# Patient Record
(demographics unavailable — no encounter records)

---

## 2024-10-31 NOTE — HISTORY OF PRESENT ILLNESS
[FreeTextEntry1] : Ms. RASHAWN CUELLO was seen today at the [facility]. She is a 61 year woman that was referred for evaluation of Mobitz I AV block. Ms. Cuello was referred by Drs Anibal and Ephraim for evaluation of prolonged MS interval and Mobitz I AV block.  She is also followed by Dr Rojas and has a history of endocarditis and stroke with subsequent mitral valve disease and mitral valve replacement in 2006. She subsequently underwent transcatheter valve in valve replacement for prosthestic mitral valve stenosis in 2019 and required treatment with oral anticoagulant therapy due to valve thrombosis.  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  She has residual right sided hand and arm weakness and has mild left sided weakness due to the stroke in 2006.  She is able to climb 1.5 flights of stairs without stopping and lives in a two story walk up apartment.  Following MCOT monitor demonstrating periods of high degree AV block a Medtronic dual chamber PPM was implanted. Today presents concerned about discoloration over her pulse generator.  She has no fevers and no pain or swelling.  She reports using hydrocortisone cream for itching over pulse generator as well as benadryl cream without relief.  Past Medical History  Pheochromocytoma status post right adrenalectomy  HTN  HFrEF NICM with LVEF 40-45%    Constitutional: WN WD WF NAD Eyes: Sclera white. PERRLA. EOMI. ENMT: No obvious oral lesions. Oropharynx clear. No palpable neck masses. Extremities: No C/C/E. CV: No JVD or carotid bruit. PMI nl. S1S2 RRR No M/R/H/G Lungs: CTA & P Abd: +BS, NT/ND no HSM : Deferred Skin: no lesions or rash- pacemaker incision well healed, no erythema or swelling no warmth.  Darker discoloration over entire area of pulse generator, with maculopapular rash along left breast and axilla. Neuro: A&Ox3, mild left upper and lower extremity weakness.  Right sided hand weakness with decreased  and limited range of motion of all fingers excluding the thumb. Psych: no abnl behaviors during exam  Medtronic dual chamber PPM   Echo 2024 LVEF 40-45%

## 2024-10-31 NOTE — DISCUSSION/SUMMARY
[FreeTextEntry1] : Impression  1. Prolonged DE interval with Mobitz I AV Block now status post Medtronic dual chamber PPM 8/13/24 with resumption of beta blocker therapy. Now complaining of discoloration along pulse generator site 2. Status post MVR 2006 and subsequent transcatheter JOSEPH 2019  3. 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 HTN  5. NICM with LVEF 40-45% on GDMT  6. Axillary artery pseudoaneurysm repair   Plan Ms. Hinton has dark discoloration over her pulse generator and a rash along left breast and axilla.  She attributes this to her use of pepermint soap- however would expect the rash to be diffuse.  She was implanted on August 13th and Tyrx pouch utilized.  Will initiate keflex therapy for one week and have her follow up in 2 weeks time for wound check.  She is aware that if this is indeed due to infection that the system would need to be removed.

## 2024-12-05 NOTE — DISCUSSION/SUMMARY
[FreeTextEntry1] : Impression  1. Prolonged [place] interval with Mobitz I AV Block now status post Medtronic dual chamber PPM 8/13/24 with resumption of beta blocker therapy.  Incision site is well healed an previous discoloration/rash has resolved. 2. Status post MVR 2006 and subsequent transcatheter JOSEPH 2019  3. 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 HTN  5. NICM with LVEF 40-45% on GDMT  6. Axillary artery pseudoaneurysm repair   Plan Ms. Hinton is doing well and issues related to skin rash and discoloration over implanted PPM have largely resolved.  Follow up in three months for device check.  Smoking cessation discussed and patient urged to  [EKG obtained to assist in diagnosis and management of assessed problem(s)] : EKG obtained to assist in diagnosis and management of assessed problem(s)

## 2024-12-05 NOTE — HISTORY OF PRESENT ILLNESS
[FreeTextEntry1] : Ms. RASHAWN CUELLO was seen today at the [facility]. She is a 61 year woman that was referred for evaluation of Mobitz I AV block. Ms. Cuello was referred by Drs Anibal and Ephraim for evaluation of prolonged [place] interval and Mobitz I AV block.  She is also followed by Dr Rojas and has a history of endocarditis and stroke with subsequent mitral valve disease and mitral valve replacement in 2006. She subsequently underwent transcatheter valve in valve replacement for prosthestic mitral valve stenosis in 2019 and required treatment with oral anticoagulant therapy due to valve thrombosis.  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  She has residual right sided hand and arm weakness and has mild left sided weakness due to the stroke in 2006.  She is able to climb 1.5 flights of stairs without stopping and lives in a two story walk up apartment.  Following MCOT monitor demonstrating periods of high degree AV block a Medtronic dual chamber PPM was implanted. Last ivisit she noted  discoloration over her pulse generator after applying steroid cream and peppermint soap to the area.   Since that time she has noted improvement and no longer notes pruritis.  She continues to smoke cigarrettes.  Past Medical History  Pheochromocytoma status post right adrenalectomy  HTN  HFrEF NICM with LVEF 40-45%    Constitutional: WN WD WF NAD Eyes: Sclera white. PERRLA. EOMI. ENMT: No obvious oral lesions. Oropharynx clear. No palpable neck masses. Extremities: No C/C/E. CV: No JVD or carotid bruit. PMI nl. S1S2 RRR No M/R/H/G Lungs: CTA & P Abd: +BS, NT/ND no HSM : Deferred Skin: no lesions or rash- pacemaker incision well healed, no erythema or swelling no warmth.  Darker discoloration over area of pulse generator has largely subsided.  Incision is well healed. Neuro: A&Ox3, mild left upper and lower extremity weakness.  Right sided hand weakness with decreased  and limited range of motion of all fingers excluding the thumb. Psych: no abnl behaviors during exam  Medtronic dual chamber PPM  in situ  ECG A sense V pace with capture and normal QRS axis with QRSd 130msec- consistent with conduction system capture.  Echo 2024 LVEF 40-45%

## 2025-01-22 NOTE — ASSESSMENT
[FreeTextEntry1] : 58 yo woman with hx od MV disease s/p surgical MVR and later transcatheter Terri for bioprosthetic stenosis. This was later complicated by valve thrombosis which improved with a/c (warfarin). 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 We are consulted for GDMT optimization for HFrEF.  Diagnosed with pheochromocytoma. Managed periop with Doxazosin and Coreg. Path showed well differentiated pheo in the adrenal gland. Now doing well from this.   Now also s/p PPM implant,   GDMT was resumed. She is doing well on this, not on diuretics.  - Continue Entresto to 49-51 mg bid - Resume Carvedilol to 12.5 mg bid - Continue Farxiga 10 mg daily - Eliquis 5 mg bid - Off Amiodarone - No maintenance diuretic needs - F/u for PPM care with Dr. Jeb Núñez MD

## 2025-01-22 NOTE — CARDIOLOGY SUMMARY
[de-identified] : 8/2024 TTE: LV 4 cm, LVH (SW 1.2 cm, PW 1 cm), LVEF 45%, RV is enalrged and borderline reduced, mod TELMA, s/p MVr with mild prothetic mitral stenosis ( peak gradient 11.4 mmHg / mean 6.90 mmHg), mod TR, IVC dilated

## 2025-01-22 NOTE — HISTORY OF PRESENT ILLNESS
[FreeTextEntry1] : Date of HF diagnosis: Etiology of CMP: Valvular Date of last hospitalization: 1/2023 Date of last echocardiogram: 14/2024 LVEF/LVEDD: 45%%/4.5cm Type of ischemic work-up: [facility] 2019 Prior RHC: No  Diabetes: No Afib: Yes CKD: Yes ICD/CRT: No  ACEi/ARB: No ARNI: Yes MRA: No BB: Yes SGLT2i: Yes Nitrates/Hydralazine: No  CardioMEMs: No In clinical trial: No  60 yo woman with prior MVR 2019 and bio-MVR stenosis in 2022 treated with transcatheter Terri. Later complicated by valve thrombosis treated effectively with anticoagulation. Later was readmitted for axillary artery pseudoaneurysm treated with endograft placement per vascular surgery.  She has severe LV dysfunction of non-ischemic etiology, known since at least 2018 on submaximal GDMT as outpatient. Noted to have worsened LV function this admission.  On serial follow-up with HF clinic, we have increased her ARNI and BB dose. She is off MRA.  Had an incidental adrenal mass discovered in 8/2022. Further work-up led to suspicion of pheochromocytoma. Removed on 9/7/2023.  After adrenalectomy BP has been decreasing.  On last visit I lowered ARNI and BB due to hypotension. On follow-up she was feeling well.   ECG showed 1st degree AVB with Wenckebach and she was referred to EP for a PPM evaluation.   She is doing well now back on GDMT. No LE edema, orthopnea or PND. Not on loop diuretics. Just had a new nerve block done by pain management with some improvement in her R arm pain.

## 2025-03-25 NOTE — CURRENT MEDS
Discussed per 10/3/19 RTW letter continued time off until 12/10/19 re-evaluation. PT notes faxed to [place] [phone] per request.    [Takes medication as prescribed] : takes [None] : Patient does not have any barriers to medication adherence

## 2025-03-27 NOTE — HISTORY OF PRESENT ILLNESS
[Current] : current [< 20 pack-years] : < 20 pack-years [Never] : never [TextBox_4] : 61 yr old female last seen in 2020, (currently smoking PMH of mediastinal lymphadenopathy multiple pulmonary nodules, PHT, stroke in 2006, Severe mitral stenosis s/p MVR, PPM 8/13/2024, benign mass removed by adrenal gland 9/2023. Presents today to reestablish care after last visit in 2020.  She was recently in ER at [facility] this Thursday.  She was having SOB due to weather changing.  She was given prednisone for 4 days which finished yesterday and given z pack finished.  She was also given neb treatments in ER and Symbicort to go home. She does have ipratropium and albuterol nebulizer.  She feels better now.  No coughing, wheezing, fevers, chest pain.  She can walk 3 blocks.    PFT today improvement in guero and DLCO compared to 2019.  Combined OLD and RLD.  8/2024 echo - moderate PHT 55 mmHg 9/2022  echo - Mild PHT 47 mm hg.  Moderate grade 2 left ventricular diastolic dysfunction.  Moderately enlarged right ventricular cavity.  Moderate tricuspid regurgitation.   CXR 8/2024  There is mild linear left lung base subsegmental atelectasis or fibrosis. There are small pleural effusions. The heart size is upper limits of normal. The patient status post sternotomy and cardiac valve replacement. There is mild tortuosity thoracic aorta. Is admitted to the hospital on he had a monitor to ensure the heart rate is low and she got the pacemaker.  She still smokes but she is tired during the day in the morning but she seems to be okay.  She is able to go to flight of stairs with no problem.  She uses the albuterol or Symbicort maybe once or twice every 2 weeks   [TextBox_11] : 0.5 [TextBox_13] : 34 [ESS] : 0

## 2025-03-27 NOTE — HISTORY OF PRESENT ILLNESS
[Current] : current [< 20 pack-years] : < 20 pack-years [Never] : never [TextBox_4] : 61 yr old female last seen in 2020, (currently smoking PMH of mediastinal lymphadenopathy multiple pulmonary nodules, PHT, stroke in 2006, Severe mitral stenosis s/p MVR, PPM 8/13/2024, benign mass removed by adrenal gland 9/2023. Presents today to reestablish care after last visit in 2020.  She was recently in ER at [facility] this Thursday.  She was having SOB due to weather changing.  She was given prednisone for 4 days which finished yesterday and given z pack finished.  She was also given neb treatments in ER and Symbicort to go home. She does have ipratropium and albuterol nebulizer.  She feels better now.  No coughing, wheezing, fevers, chest pain.  She can walk 3 blocks.    PFT today improvement in guero and DLCO compared to 2019.  Combined OLD and RLD.  8/2024 echo - moderate PHT 55 mmHg 9/2022  echo - Mild PHT 47 mm hg.  Moderate grade 2 left ventricular diastolic dysfunction.  Moderately enlarged right ventricular cavity.  Moderate tricuspid regurgitation.   CXR 8/2024  There is mild linear left lung base subsegmental atelectasis or fibrosis. There are small pleural effusions. The heart size is upper limits of normal. The patient status post sternotomy and cardiac valve replacement. There is mild tortuosity thoracic aorta. Is admitted to the hospital on he had a monitor to ensure the heart rate is low and she got the pacemaker.  She still smokes but she is tired during the day in the morning but she seems to be okay.  She is able to go to flight of stairs with no problem.  She uses the albuterol or Symbicort maybe once or twice every 2 weeks   [TextBox_11] : 0.5 [TextBox_13] : 34 [ESS] : 0

## 2025-03-27 NOTE — HISTORY OF PRESENT ILLNESS
[Current] : current [< 20 pack-years] : < 20 pack-years [Never] : never [TextBox_4] : 61 yr old female last seen in 2020, (currently smoking PMH of mediastinal lymphadenopathy multiple pulmonary nodules, PHT, stroke in 2006, Severe mitral stenosis s/p MVR, PPM 8/13/2024, benign mass removed by adrenal gland 9/2023. Presents today to reestablish care after last visit in 2020.  She was recently in ER at [facility] this Thursday.  She was having SOB due to weather changing.  She was given prednisone for 4 days which finished yesterday and given z pack finished.  She was also given neb treatments in ER and Symbicort to go home. She does have ipratropium and albuterol nebulizer.  She feels better now.  No coughing, wheezing, fevers, chest pain.  She can walk 3 blocks.    PFT today improvement in guero and DLCO compared to 2019.  Combined OLD and RLD.  8/2024 echo - moderate PHT 55 mmHg 9/2022  echo - Mild PHT 47 mm hg.  Moderate grade 2 left ventricular diastolic dysfunction.  Moderately enlarged right ventricular cavity.  Moderate tricuspid regurgitation.   CXR 8/2024  There is mild linear left lung base subsegmental atelectasis or fibrosis. There are small pleural effusions. The heart size is upper limits of normal. The patient status post sternotomy and cardiac valve replacement. There is mild tortuosity thoracic aorta. Is admitted to the hospital on he had a monitor to ensure the heart rate is low and she got the pacemaker.  She still smokes but she is tired during the day in the morning but she seems to be okay.  She is able to go to flight of stairs with no problem.  She uses the albuterol or Symbicort maybe once or twice every 2 weeks   [TextBox_11] : 0.5 [TextBox_13] : 34 [ESS] : 0

## 2025-03-27 NOTE — END OF VISIT
[Time Spent: ___ minutes] : I have spent [unfilled] minutes of time on the encounter which excludes teaching and separately reported services. [FreeTextEntry3] : Pt seen with NATASHA Brooks and agree on the above plan of care. The patient is clinically improving.  The patient is not totally compliant with the Symbicort.  Instructed the patient to continue on Symbicort 2 puffs twice a day and rinse the mouth.  I also informed her that most the improvement in her PFT could be related to control of her CHF.  Follow-up on CT scan of the chest

## 2025-03-27 NOTE — ASSESSMENT
[FreeTextEntry1] : #Pulmonary Nodules The CT scan of the chest revealed small nodule.  The patient is patient still smoking I will be included in the lung cancer screening program and just got her to have a CT scan next year #Asthma The patient using the albuterol or Symbicort as needed basis.  That probably she uses every once a week or every 2 weeks.  At this point the patient is stable on the current regimen and will continue as needed.  Since the last visit the patient did not require neither urgent care nor systemic steroids.  The baseline oxygen saturation is normal.  PFT today improvement in guero and DLCO compared to 2019.  Combined OLD and RLD. Continue on Symbicort BID and albuterol as needed.   CXR 8/2024  There is mild linear left lung base subsegmental atelectasis or fibrosis. There are small pleural effusions. The heart size is upper limits of normal. The patient status post sternotomy and cardiac valve replacement. There is mild tortuosity thoracic aorta.   Follow with repeat CT scan.  #pulmoanry HTN   8/2024 echo - moderate PHT 55 mmHg 9/2022  echo - Mild PHT 47 mm hg.  Moderate grade 2 left ventricular diastolic dysfunction.  Moderately enlarged right ventricular cavity.  Moderate tricuspid regurgitation.   Negative 6 min walk without desaturation. Continue with cardiology for management.  No signs of PE or SAIDA.  I ordered the test for the sleep study to rule out underlying obstructive sleep apnea that is possible to contribute to underlying pulmonary hypertension but might my opinion her pulmonary hypertension mostly is group 2.  Discussed with the patient smoke cessation.  She was not side effects before but she stopped the because of the side effects was concerning to her.  The patient will be cutting down on cigarette smoking and 1 by any cigarette after she finished the cartons that she has at home

## 2025-04-03 NOTE — HISTORY OF PRESENT ILLNESS
[FreeTextEntry1] : Ms. RASHAWN MCWILLIAMS was seen today at the [facility]. She is a 61 year woman that was initially referred by Key Barnett and Ephraim for evaluation of prolonged MA interval and Mobitz I AV block.  She is also followed by Dr Rojas and has a history of endocarditis and stroke with subsequent mitral valve disease and mitral valve replacement in 2006. She subsequently underwent transcatheter valve in valve replacement for prosthestic mitral valve stenosis in 2019 and required treatment with oral anticoagulant therapy due to valve thrombosis.  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  She has residual right sided hand and arm weakness and has mild left sided weakness due to the stroke in 2006.  She is able to climb 1.5 flights of stairs without stopping and lives in a two story walk up apartment.  After developing periodic high degree AV block a Medtronic dual chamber PPM was implanted. She is currently doing well and has been restarted on GDMT.  She continues to smoke cigarrettes.  Past Medical History  Pheochromocytoma status post right adrenalectomy  HTN  HFrEF NICM with LVEF 40-45%    Constitutional: WN WD WF NAD Eyes: Sclera white. PERRLA. EOMI. ENMT: No obvious oral lesions. Oropharynx clear. No palpable neck masses. Extremities: No C/C/E. CV: No JVD or carotid bruit. PMI nl. S1S2 RRR No M/R/H/G Lungs: CTA & P Abd: +BS, NT/ND no HSM : Deferred Skin: no lesions or rash- pacemaker incision well healed, no erythema or swelling no warmth.   Neuro: A&Ox3, mild left upper and lower extremity weakness.  Right sided hand weakness with decreased  and limited range of motion of all fingers excluding the thumb. Psych: no abnl behaviors during exam  Medtronic dual chamber PPM  in situ Normal device function V pacing thresholds less than 0.75V in unipolar and bipolar configurations Underlying rhythm sinus with prolonged MA interval No significant arrhythmia noted on device telemetry. See scanned in documents  ECG 12/2024  A sense V pace with capture and normal QRS axis with QRSd 130msec- consistent with conduction system capture.  Echo 2024 LVEF 40-45%

## 2025-04-03 NOTE — DISCUSSION/SUMMARY
[FreeTextEntry1] : Impression  1. Prolonged [place] interval with Mobitz I AV Block now status post Medtronic dual chamber PPM 8/13/24 with resumption of beta blocker therapy.  Incision site is well healed an previous discoloration/rash has resolved. 2. Status post MVR 2006 and subsequent transcatheter JOSEPH 2019  3. 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 HTN  5. NICM with LVEF 40-45% on GDMT  6. Axillary artery pseudoaneurysm repair   Plan Ms. Hinton is doing well.    Underlying rhythm is sinus with prolonged AR interval.  Unipolar and bipolar pacing thresholds are satisfactory (<0.75V@0.4msec)  Continue follow up with Dr Ye.  Will follow device on remote.  Follow up in person in one year. Smoking cessation discussed and patient urged to

## 2025-05-23 NOTE — HISTORY OF PRESENT ILLNESS
[FreeTextEntry1] : Date of HF diagnosis: Etiology of CMP: Valvular Date of last hospitalization: 1/2023 Date of last echocardiogram: 14/2024 LVEF/LVEDD: 45%%/4.5cm Type of ischemic work-up: [facility] 2019 Prior RHC: No  Diabetes: No Afib: Yes CKD: Yes ICD/CRT: No  ACEi/ARB: No ARNI: Yes MRA: No BB: Yes SGLT2i: Yes Nitrates/Hydralazine: No  CardioMEMs: No In clinical trial: No  60 yo woman with prior MVR 2019 and bio-MVR stenosis in 2022 treated with transcatheter Terri. Later complicated by valve thrombosis treated effectively with anticoagulation. Later was readmitted for axillary artery pseudoaneurysm treated with endograft placement per vascular surgery.  She has severe LV dysfunction of non-ischemic etiology, known since at least 2018 on submaximal GDMT as outpatient. Noted to have worsened LV function this admission.  On serial follow-up with HF clinic, we have increased her ARNI and BB dose. She is off MRA.  Had an incidental adrenal mass discovered in 8/2022. Further work-up led to suspicion of pheochromocytoma. Removed on 9/7/2023.  After adrenalectomy BP has been decreasing.  On last visit I lowered ARNI and BB due to hypotension. On follow-up she was feeling well.  ECG showed 1st degree AVB with Wenckebach and she was referred to EP and now s/p DC-PPM 8/13/2024.   ============= She is doing well now back on GDMT. Brings her medications with her today, and took her medications with food. She lives in [place], on the second floor apartments and manages both flights without stopping without overt dyspnea nor chest pain. She walks locally in her community 1-2 blocks and manages ADLS without dyspnea. Her primary limitation is now a backers cyst of the knee, which is being treated conservatively.  No longer dizzy or LH since her PPM.  Of note more recently requiring po abx due to recurrent urinary infections, unsure of the name.   Denies any orthopnea. Denies LE edema. Denies any palpitations. She continues to smoke cigarettes, 1-3 daily.

## 2025-05-23 NOTE — HISTORY OF PRESENT ILLNESS
[FreeTextEntry1] : Date of HF diagnosis: Etiology of CMP: Valvular Date of last hospitalization: 1/2023 Date of last echocardiogram: 14/2024 LVEF/LVEDD: 45%%/4.5cm Type of ischemic work-up: LHC 2019 Prior RHC: No  Diabetes: No Afib: Yes CKD: Yes ICD/CRT: No  ACEi/ARB: No ARNI: Yes MRA: No BB: Yes SGLT2i: Yes Nitrates/Hydralazine: No  CardioMEMs: No In clinical trial: No  60 yo woman with prior MVR 2019 and bio-MVR stenosis in 2022 treated with transcatheter Terri. Later complicated by valve thrombosis treated effectively with anticoagulation. Later was readmitted for axillary artery pseudoaneurysm treated with endograft placement per vascular surgery.  She has severe LV dysfunction of non-ischemic etiology, known since at least 2018 on submaximal GDMT as outpatient. Noted to have worsened LV function this admission.  On serial follow-up with HF clinic, we have increased her ARNI and BB dose. She is off MRA.  Had an incidental adrenal mass discovered in 8/2022. Further work-up led to suspicion of pheochromocytoma. Removed on 9/7/2023.  After adrenalectomy BP has been decreasing.  On last visit I lowered ARNI and BB due to hypotension. On follow-up she was feeling well.  ECG showed 1st degree AVB with Wenckebach and she was referred to EP and now s/p DC-PPM 8/13/2024.   ============= She is doing well now back on GDMT. Brings her medications with her today, and took her medications with food. She lives in [place], on the second floor apartments and manages both flights without stopping without overt dyspnea nor chest pain. She walks locally in her community 1-2 blocks and manages ADLS without dyspnea. Her primary limitation is now a backers cyst of the knee, which is being treated conservatively.  No longer dizzy or LH since her PPM.  Of note more recently requiring po abx due to recurrent urinary infections, unsure of the name.   Denies any orthopnea. Denies LE edema. Denies any palpitations. She continues to smoke cigarettes, 1-3 daily.

## 2025-05-23 NOTE — CARDIOLOGY SUMMARY
[de-identified] : 8/2024 TTE: LV 4 cm, LVH (SW 1.2 cm, PW 1 cm), LVEF 45%, RV is enalrged and borderline reduced, mod TELMA, s/p MVr with mild prothetic mitral stenosis ( peak gradient 11.4 mmHg / mean 6.90 mmHg), mod TR, IVC dilated

## 2025-05-23 NOTE — ASSESSMENT
[FreeTextEntry1] : 62 yo woman with hx od MV disease s/p surgical MVR and later transcatheter Terri for bioprosthetic stenosis. This was later complicated by valve thrombosis which improved with a/c (warfarin). 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   Diagnosed with pheochromocytoma. Managed periop with Doxazosin and Coreg. Path showed well differentiated pheo in the adrenal gland. Now doing well from this.   Now also s/p PPM implant, DC-PPM 8/13/2024. GDMT was resumed. Overall she is doing well from a HF perspective. She is NYHA Class II and euvolemic on exam not requiring diuretics.   #HFrEF - Continue Entresto to 49-51 mg bid - Continue Carvedilol to 12.5 mg bid - STOP Farxiga 10 mg daily 2/2 frequent UTIs  - Continue Eliquis 5 mg bid - Off Amiodarone - No maintenance diuretic needs  #s/p TERRI replacement 2/2 prosthestic mitral valve stenosis in 2019 c/b valve thrombosis  - on AC as above  #Second Degree AVB - s/p DC- PPM (8/13/2024) following with Dr. Russ  #Current tobacco user - complete cessation advised   Follow up with Dr. Ye in 6 months.  Jesus Núñez MD

## 2025-05-23 NOTE — ASSESSMENT
[FreeTextEntry1] : 60 yo woman with hx od MV disease s/p surgical MVR and later transcatheter Terri for bioprosthetic stenosis. This was later complicated by valve thrombosis which improved with a/c (warfarin). 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   Diagnosed with pheochromocytoma. Managed periop with Doxazosin and Coreg. Path showed well differentiated pheo in the adrenal gland. Now doing well from this.   Now also s/p PPM implant, DC-PPM 8/13/2024. GDMT was resumed. Overall she is doing well from a HF perspective. She is NYHA Class II and euvolemic on exam not requiring diuretics.   #HFrEF - Continue Entresto to 49-51 mg bid - Continue Carvedilol to 12.5 mg bid - STOP Farxiga 10 mg daily 2/2 frequent UTIs  - Continue Eliquis 5 mg bid - Off Amiodarone - No maintenance diuretic needs  #s/p TERRI replacement 2/2 prosthestic mitral valve stenosis in 2019 c/b valve thrombosis  - on AC as above  #Second Degree AVB - s/p DC- PPM (8/13/2024) following with Dr. Russ  #Current tobacco user - complete cessation advised   Follow up with Dr. Ye in 6 months.  Jesus Núñez MD

## 2025-05-23 NOTE — CARDIOLOGY SUMMARY
[de-identified] : 8/2024 TTE: LV 4 cm, LVH (SW 1.2 cm, PW 1 cm), LVEF 45%, RV is enalrged and borderline reduced, mod TELMA, s/p MVr with mild prothetic mitral stenosis ( peak gradient 11.4 mmHg / mean 6.90 mmHg), mod TR, IVC dilated

## 2025-05-23 NOTE — END OF VISIT
[FreeTextEntry3] : I, Dr. Ye, personally performed the evaluation and management (E/M) services for this patient who presents today. Today, my ISAAC, Mili Correa was here to observe my evaluation and management of services.

## 2025-07-10 NOTE — HISTORY OF PRESENT ILLNESS
[Current] : current [Never] : never [>= 20 pack years] : >= 20 pack years [TextBox_4] : 61 yr old female last seen in 2020, (currently smoking PMH of mediastinal lymphadenopathy multiple pulmonary nodules, PHT, stroke in 2006, Severe mitral stenosis s/p MVR, PPM 8/13/2024, benign mass removed by adrenal gland 9/2023. Presents today to reestablish care after last visit in 2020.  She was recently in ER at [facility] this Thursday.  She was having SOB due to weather changing.  She was given prednisone for 4 days which finished yesterday and given z pack finished.  She was also given neb treatments in ER and Symbicort to go home. She does have ipratropium and albuterol nebulizer.  She feels better now.  No coughing, wheezing, fevers, chest pain.  She can walk 3 blocks.    PFT today improvement in guero and DLCO compared to 2019.  Combined OLD and RLD.  8/2024 echo - moderate PHT 55 mmHg 9/2022  echo - Mild PHT 47 mm hg.  Moderate grade 2 left ventricular diastolic dysfunction.  Moderately enlarged right ventricular cavity.  Moderate tricuspid regurgitation.   CXR 8/2024  There is mild linear left lung base subsegmental atelectasis or fibrosis. There are small pleural effusions. The heart size is upper limits of normal. The patient status post sternotomy and cardiac valve replacement. There is mild tortuosity thoracic aorta. Is admitted to the hospital on he had a monitor to ensure the heart rate is low and she got the pacemaker.  She still smokes but she is tired during the day in the morning but she seems to be okay.  She is able to go to flight of stairs with no problem.  She uses the albuterol or Symbicort maybe once or twice every 2 weeks  7/10/25 visit  She has a sore throat early June with ear pain on the left side.  She was admitted to [facility] due to low BP.  She ended up with Tonsilities and treated with antibiotics.  She has not required bronchodilators for her breathing or nebulizer.  She is triggered by sickness or weather changes.  Denies any need for prednisone since last visit.  Denies SOB, chest tightness.  She lives on a 2 flight walk up, denies SOB with SOB, coughing, fevers.    Pt still smoking with 5 cigs a day with total of 20 pack yrs.    [ESS] : 0

## 2025-07-10 NOTE — ASSESSMENT
[FreeTextEntry1] : #Pulmonary Nodules The CT scan of the chest revealed small nodule.  The patient is patient still smoking I will be included in the lung cancer screening program and just got her to have a CT scan next year  #Asthma The patient using the albuterol or Symbicort as needed basis.  That probably she uses every once a week or every 2 weeks.  At this point the patient is stable on the current regimen and will continue as needed.  Since the last visit the patient did not require neither urgent care nor systemic steroids.  The baseline oxygen saturation is normal.  PFT today improvement in guero and DLCO compared to 2019.  Combined OLD and RLD. Continue on Symbicort BID and albuterol as needed.   CXR 8/2024  There is mild linear left lung base subsegmental atelectasis or fibrosis. There are small pleural effusions. The heart size is upper limits of normal. The patient status post sternotomy and cardiac valve replacement. There is mild tortuosity thoracic aorta.   Follow with repeat CT scan.  #pulmoanry HTN   8/2024 echo - moderate PHT 55 mmHg 9/2022  echo - Mild PHT 47 mm hg.  Moderate grade 2 left ventricular diastolic dysfunction.  Moderately enlarged right ventricular cavity.  Moderate tricuspid regurgitation.   Due for repeat echo with cardiology in OCT 2025.  Pt to bring a copy of Echo with her next visit.  Negative 6 min walk without desaturation. Continue with cardiology for management.  No signs of PE or SAIDA.  I ordered the test for the sleep study to rule out underlying obstructive sleep apnea that is possible to contribute to underlying pulmonary hypertension but might my opinion her pulmonary hypertension mostly is group 2.  Discussed with the patient smoke cessation.  She was not side effects before but she stopped the because of the side effects was concerning to her.  The patient will be cutting down on cigarette smoking and 1 by any cigarette after she finished the cartons that she has at home
